# Patient Record
Sex: MALE | URBAN - NONMETROPOLITAN AREA
[De-identification: names, ages, dates, MRNs, and addresses within clinical notes are randomized per-mention and may not be internally consistent; named-entity substitution may affect disease eponyms.]

---

## 2022-11-01 ENCOUNTER — TELEPHONE (OUTPATIENT)
Dept: NEUROSURGERY | Age: 62
End: 2022-11-01

## 2022-11-01 NOTE — TELEPHONE ENCOUNTER
Patient was called and scheduled for 1/10/23 @ 1 on 10/13. Next available appointment for Bee Ibarra.

## 2022-11-02 ENCOUNTER — TELEPHONE (OUTPATIENT)
Dept: NEUROSURGERY | Age: 62
End: 2022-11-02

## 2022-11-02 NOTE — TELEPHONE ENCOUNTER
Patient called stating he had a referral sent to our office to see Dr. Kin Boyd. I let patient know I would have to transfer him to the  because they schedule for him.  was busy so I just gave them his MRN. I let patient know they were busy and would call him back. Patient stated, \"well I only have 2 days of medicine left. \" I let patient know he is booking out until Feb. 2023 and someone would call him back to schedule. Patient voiced understanding.

## 2022-12-22 ENCOUNTER — TELEPHONE (OUTPATIENT)
Dept: NEUROSURGERY | Age: 62
End: 2022-12-22

## 2022-12-22 NOTE — TELEPHONE ENCOUNTER
Tried calling patient to r/s appt 1/10 due to provider being out of the office. Only phone number listed has been disconnected. Pt appt changed to 1/3 @ 1:00. No other phone number listed on referral either.

## 2022-12-28 VITALS — HEIGHT: 62 IN | BODY MASS INDEX: 20.98 KG/M2 | WEIGHT: 114 LBS

## 2022-12-28 PROBLEM — F32.A DEPRESSIVE DISORDER: Status: ACTIVE | Noted: 2022-10-07

## 2022-12-28 PROBLEM — R56.9 SEIZURE (HCC): Status: ACTIVE | Noted: 2022-10-07

## 2022-12-28 PROBLEM — F41.9 ANXIETY: Status: ACTIVE | Noted: 2022-10-07

## 2022-12-28 RX ORDER — PHENYTOIN 50 MG/1
1 TABLET, CHEWABLE ORAL DAILY
COMMUNITY
Start: 2022-12-06

## 2022-12-28 RX ORDER — PHENYTOIN SODIUM 100 MG/1
1 CAPSULE, EXTENDED RELEASE ORAL 3 TIMES DAILY
COMMUNITY
Start: 2022-12-06

## 2022-12-28 RX ORDER — SPIRONOLACTONE 100 MG/1
1 TABLET, FILM COATED ORAL DAILY
COMMUNITY

## 2022-12-28 RX ORDER — TIOTROPIUM BROMIDE 18 UG/1
1 CAPSULE ORAL; RESPIRATORY (INHALATION) DAILY
COMMUNITY

## 2022-12-28 RX ORDER — PHENOBARBITAL 30 MG/1
1 TABLET ORAL 3 TIMES DAILY
COMMUNITY
Start: 2022-10-05

## 2023-03-09 ENCOUNTER — TELEPHONE (OUTPATIENT)
Dept: NEUROLOGY | Age: 63
End: 2023-03-09

## 2023-03-09 VITALS — WEIGHT: 119 LBS | HEIGHT: 62 IN | BODY MASS INDEX: 21.9 KG/M2

## 2023-03-09 NOTE — TELEPHONE ENCOUNTER
Called patient due to no showing the last 2 appt's with Dr Charleen Brooke to confirm his appt on Monday. The patient does not have a working number available. Canceling the appt that is scheduled for Monday, patient can call and r/s at any time.

## 2023-03-13 ENCOUNTER — OFFICE VISIT (OUTPATIENT)
Dept: NEUROLOGY | Age: 63
End: 2023-03-13
Payer: MEDICAID

## 2023-03-13 VITALS
BODY MASS INDEX: 21.9 KG/M2 | DIASTOLIC BLOOD PRESSURE: 70 MMHG | OXYGEN SATURATION: 95 % | SYSTOLIC BLOOD PRESSURE: 124 MMHG | HEIGHT: 62 IN | WEIGHT: 119 LBS | HEART RATE: 103 BPM

## 2023-03-13 DIAGNOSIS — R56.9 SEIZURE-LIKE ACTIVITY (HCC): Primary | ICD-10-CM

## 2023-03-13 DIAGNOSIS — Z79.899 MEDICATION MANAGEMENT: ICD-10-CM

## 2023-03-13 DIAGNOSIS — R56.9 SEIZURE-LIKE ACTIVITY (HCC): ICD-10-CM

## 2023-03-13 LAB
ALBUMIN SERPL-MCNC: 4 G/DL (ref 3.5–5.2)
ALP BLD-CCNC: 114 U/L (ref 40–130)
ALT SERPL-CCNC: 11 U/L (ref 5–41)
ANION GAP SERPL CALCULATED.3IONS-SCNC: 11 MMOL/L (ref 7–19)
AST SERPL-CCNC: 14 U/L (ref 5–40)
BASOPHILS ABSOLUTE: 0.1 K/UL (ref 0–0.2)
BASOPHILS RELATIVE PERCENT: 1 % (ref 0–1)
BILIRUB SERPL-MCNC: <0.2 MG/DL (ref 0.2–1.2)
BUN BLDV-MCNC: 14 MG/DL (ref 8–23)
CALCIUM SERPL-MCNC: 9.3 MG/DL (ref 8.8–10.2)
CHLORIDE BLD-SCNC: 102 MMOL/L (ref 98–111)
CO2: 26 MMOL/L (ref 22–29)
CREAT SERPL-MCNC: 0.9 MG/DL (ref 0.5–1.2)
EOSINOPHILS ABSOLUTE: 0.5 K/UL (ref 0–0.6)
EOSINOPHILS RELATIVE PERCENT: 7 % (ref 0–5)
GFR SERPL CREATININE-BSD FRML MDRD: >60 ML/MIN/{1.73_M2}
GLUCOSE BLD-MCNC: 82 MG/DL (ref 74–109)
HCT VFR BLD CALC: 43.5 % (ref 42–52)
HEMOGLOBIN: 14.5 G/DL (ref 14–18)
IMMATURE GRANULOCYTES #: 0 K/UL
LYMPHOCYTES ABSOLUTE: 2.2 K/UL (ref 1.1–4.5)
LYMPHOCYTES RELATIVE PERCENT: 30.9 % (ref 20–40)
MCH RBC QN AUTO: 31.7 PG (ref 27–31)
MCHC RBC AUTO-ENTMCNC: 33.3 G/DL (ref 33–37)
MCV RBC AUTO: 95 FL (ref 80–94)
MONOCYTES ABSOLUTE: 0.6 K/UL (ref 0–0.9)
MONOCYTES RELATIVE PERCENT: 9.1 % (ref 0–10)
NEUTROPHILS ABSOLUTE: 3.6 K/UL (ref 1.5–7.5)
NEUTROPHILS RELATIVE PERCENT: 51.7 % (ref 50–65)
PDW BLD-RTO: 13.5 % (ref 11.5–14.5)
PHENOBARBITAL LEVEL: 14.3 UG/ML (ref 15–40)
PHENYTOIN LEVEL: 8.1 UG/ML (ref 10–20)
PLATELET # BLD: 258 K/UL (ref 130–400)
PMV BLD AUTO: 10.3 FL (ref 9.4–12.4)
POTASSIUM SERPL-SCNC: 4.4 MMOL/L (ref 3.5–5)
RBC # BLD: 4.58 M/UL (ref 4.7–6.1)
SODIUM BLD-SCNC: 139 MMOL/L (ref 136–145)
TOTAL PROTEIN: 7.6 G/DL (ref 6.6–8.7)
WBC # BLD: 7 K/UL (ref 4.8–10.8)

## 2023-03-13 PROCEDURE — 99204 OFFICE O/P NEW MOD 45 MIN: CPT | Performed by: NURSE PRACTITIONER

## 2023-03-13 RX ORDER — PHENYTOIN 50 MG/1
50 TABLET, CHEWABLE ORAL DAILY
Qty: 90 TABLET | Refills: 1 | Status: SHIPPED | OUTPATIENT
Start: 2023-03-13

## 2023-03-13 RX ORDER — PHENYTOIN SODIUM 100 MG/1
100 CAPSULE, EXTENDED RELEASE ORAL 3 TIMES DAILY
Qty: 180 CAPSULE | Refills: 1 | Status: SHIPPED | OUTPATIENT
Start: 2023-03-13

## 2023-03-13 NOTE — PROGRESS NOTES
[facility] Neurology Office Note      Patient:   Angela Perez  MR#:    [id number]  Account Number:                         YOB: 1960  Date of Evaluation:  3/13/2023  Time of Note:                          3:31 PM  Primary/Referring Physician:  Roger Thurston   Consulting Physician:  BRIGIDA Syed    NEW PATIENT CONSULTATION      Chief Complaint   Patient presents with    New Patient    Seizures       HISTORY OF PRESENT ILLNESS    Angela Perez is a 57y.o. year old male here for seizure disorder. He relates seizure started at around age 1. History of prematurity. He states it has been 3 years since his last seizure. Seizures typically characterized by loss of consciousness with generalized tonic-clonic activity. There is both tongue biting and urinary incontinence. There is postictal state afterwards. He states seizures are sometimes preceded by weakness and dizziness, but not always. Denies clear consistent aura. There is no known history of TBI, meningitis, encephalitis. He recently relocated here from [place], [place]. Has been in the area about 6 months. He is on Dilantin 50 mg chewable tablet, Dilantin capsules 100 mg 3 times daily. Is also on phenobarbital 30 mg 3 times daily. No issues with side effects at present. Has had EEG in the past, he is unsure of when. Has been followed by neurology in the past, he is unsure of where or when. No other concerns today. Past Medical History:   Diagnosis Date    Anxiety     Depression     Seizure [facility])        Past Surgical History:   Procedure Laterality Date    HERNIA REPAIR      SHOULDER SURGERY         History reviewed. No pertinent family history.     Social History     Socioeconomic History    Marital status: Unknown     Spouse name: Not on file    Number of children: Not on file    Years of education: Not on file    Highest education level: Not on file   Occupational History    Not on file   Tobacco Use    Smoking status: Not on file    Smokeless tobacco: Not on file   Substance and Sexual Activity    Alcohol use: Not on file    Drug use: Not on file    Sexual activity: Not on file   Other Topics Concern    Not on file   Social History Narrative    Not on file     Social Determinants of Health     Financial Resource Strain: Not on file   Food Insecurity: Not on file   Transportation Needs: Not on file   Physical Activity: Not on file   Stress: Not on file   Social Connections: Not on file   Intimate Partner Violence: Not on file   Housing Stability: Not on file       Current Outpatient Medications   Medication Sig Dispense Refill    phenytoin (DILANTIN) 50 MG tablet Take 1 tablet by mouth daily 90 tablet 1    phenytoin (DILANTIN) 100 MG ER capsule Take 1 capsule by mouth 3 times daily 180 capsule 1    estradiol valerate (DELESTROGEN) 20 MG/ML injection Inject 0.2 mLs into the muscle once a week      PHENobarbital (LUMINAL) 30 MG tablet Take 1 tablet by mouth 3 times daily. spironolactone (ALDACTONE) 100 MG tablet Take 1 tablet by mouth daily      tiotropium (SPIRIVA HANDIHALER) 18 MCG inhalation capsule Inhale 1 capsule into the lungs daily       No current facility-administered medications for this visit.        Allergies   Allergen Reactions    Fish-Derived Products     Penicillin G Nausea Only     Other reaction(s): vomiting    Dm-Phenylephrine-Acetaminophen Rash         REVIEW OF SYSTEMS  Constitutional: []Fever []Sweat []Chills [] Recent Injury [x] Denies all unless marked  HEENT:[]Headache  [] Head Injury/Hearing Loss  [] Sore Throat  [] Ear Ache/Dizziness  [x] Denies all unless marked  Spine:  [] Neck pain  [] Back pain  [] Sciaticia  [x] Denies all unless marked  Cardiovascular:[]Heart Disease []Chest Pain [] Palpitations  [x] Denies all unless marked  Pulmonary: []Shortness of Breath []Cough   [x] Denies all unless marke  Gastrointestinal: []Nausea  []Vomiting  []Abdominal Pain  []Constipation  []Diarrhea  []Dark Bloody Stools  [x] Denies all unless marked  Psychiatric/Behavioral:[] Depression [] Anxiety [x] Denies all unless marked  Genitourinary:   [] Frequency  [] Urgency  [] Incontinence [] Pain with Urination  [x] Denies all unless marked  Extremities: []Pain  []Swelling  [x] Denies all unless marked  Musculoskeletal: [] Muscle Pain  [] Joint Pain  [] Arthritis [] Muscle Cramps [] Muscle Twitches  [x] Denies all unless marked  Sleep: [] Insomnia [] Snoring [] Restless Legs [] Sleep Apnea  [] Daytime Sleepiness  [x] Denies all unless marked  Skin:[] Rash [] Skin Discoloration [x] Denies all unless marked   Neurological: []Visual Disturbance/Memory Loss [] Loss of Balance [] Slurred Speech/Weakness [] Seizures  [] Vertigo/Dizziness [x] Denies all unless marked    The MA has completed the ROS with the patient. I have reviewed it in its' entirety with the patient and agree with the documentation. PHYSICAL EXAM  /70   Pulse (!) 103   Ht 5' 2\" (1.575 m)   Wt 119 lb (54 kg)   SpO2 95%   BMI 21.77 kg/m²       Constitutional - No acute distress    HEENT- Conjunctiva normal.  No scars, masses, or lesions over external nose or ears, no neck masses noted, no jugular vein distension, no bruit  Cardiac- Regular rate and rhythm  Pulmonary- Good expansion, normal effort without use of accessory muscles  Musculoskeletal - No significant wasting of muscles noted, no bony deformities  Extremities - No clubbing, cyanosis or edema  Skin - Warm, dry, and intact. No rash, erythema, or pallor  Psychiatric - Mood, affect, and behavior appear normal      NEUROLOGICAL EXAM     Mental status   [x] Awake, alert, oriented   [x]Affect attention and concentration appear appropriate  [x]Recent and remote memory appears unremarkable  [x]Speech normal without dysarthria or aphasia, comprehension and repetition intact.    COMMENTS:    Cranial Nerves [x]No VF deficit to confrontation  [x]PERRLA, EOMI, no nystagmus, conjugate eye movements, no ptosis  [x]Face symmetric  [x]Facial sensation intact  [x]Tongue midline no atrophy or fasciculations present  [x]Palate midline, hearing to finger rub normal bilaterally  [x]Shoulder shrug and SCM testing normal bilaterally  COMMENTS:   Motor   [x]5/5 strength x 4 extremities  [x]Normal bulk and tone  [x]No tremor present  [x]No rigidity or bradykinesia noted  COMMENTS:   Sensory  [x]Sensation intact to light touch, vibration, and proprioception BLE  [x]Sensation intact to light touch, vibration, and proprioception BUE  COMMENTS:   Coordination [x]FTN normal bilaterally   []HTS normal bilaterally  [x]PRASANTH normal bilaterally. COMMENTS:   Reflexes  [x]Symmetric and non-pathological  []Toes down going bilaterally  [x]No clonus present  COMMENTS:   Gait                  [x]Normal steady gait    []Ataxic    []Spastic     []Magnetic     []Shuffling  COMMENTS:       LABS RECORD AND IMAGING REVIEW (As below and per HPI)      Reviewed referral records     ASSESSMENT:    Foreign Corona is a 57y.o. year old male here for establishment of care of seizure disorder. He has had seizure disorder since age 1. Seizures characterized by loss of consciousness with GTC activity. No events for about 3 years. He is on Dilantin 50 mg chewable tablet, Dilantin capsules 100 mg 3 times daily. Is also on phenobarbital 30 mg 3 times daily. No issues with side effects at present. Has been sometime since last EEG. Has recently relocated here from [facility]. No records from prior neurology care. We will order EEG, MRI brain, blood work today. Will refill Dilantin and phenobarbital and have him follow-up with Dr. Amado Yanez. ICD-10-CM    1. Seizure-like activity ([facility] [address]  R56.9 Phenytoin level, total     Phenobarbital Level     MRI BRAIN W WO CONTRAST     EEG awake and asleep     CBC with Auto Differential     Comprehensive Metabolic Panel     phenytoin (DILANTIN) 50 MG tablet     phenytoin (DILANTIN) 100 MG ER capsule      2. Medication management  Z79.899           PLAN:  Labs as listed today. EEG awake and asleep . MRI Brain W WO- Open due to claustrophobia. Epilepsy precautions and seizure first aid discussed. No heights, swimming, tub baths, open flames, or heavy machinery. Driving per [employer] Continue phenobarbital 30 mg 3 times daily. Levels drawn today. Continue Dilantin capsules 100 mg 3 times daily, Dilantin chewable 50 mg daily. Levels drawn today. 7. Return in about 3 months (around 6/13/2023) for Schedule with Dr. Neil Riddle, Follow up, sooner with worsening symptoms. BRIGIDA Deutsch    This dictation was generated by voice recognition computer software. Although all attempts were made to edit the dictation for accuracy, there may be errors in the transcription that are not intended.

## 2023-03-14 DIAGNOSIS — R56.9 SEIZURE-LIKE ACTIVITY (HCC): Primary | ICD-10-CM

## 2023-03-14 RX ORDER — PHENOBARBITAL 30 MG/1
30 TABLET ORAL 3 TIMES DAILY
Qty: 90 TABLET | Refills: 3 | Status: SHIPPED | OUTPATIENT
Start: 2023-03-14 | End: 2023-04-13

## 2023-03-14 NOTE — TELEPHONE ENCOUNTER
Requested Prescriptions     Pending Prescriptions Disp Refills    PHENobarbital (LUMINAL) 30 MG tablet 90 tablet 3     Sig: Take 1 tablet by mouth 3 times daily for 30 days.  Max Daily Amount: 90 mg       Last Office Visit:  3/13/2023  Next Office Visit:  6/13/2023  Last Medication Refill: 12/28/22  Leonidas Cooney up to date:  03/14/2023    *RX updated to reflect   03/14/2023  fill date*

## 2023-05-23 ENCOUNTER — TELEPHONE (OUTPATIENT)
Dept: NEUROSURGERY | Age: 63
End: 2023-05-23

## 2023-05-24 NOTE — TELEPHONE ENCOUNTER
Called patient back to verify which appointment he was needing to r/s. Pt stated he just needed to know the day and times of his upcoming appointments. I told patient the days and times of his testing and appointment with Dr. Erline Castleman. Pt voiced understanding.

## 2023-06-29 DIAGNOSIS — R56.9 SEIZURE-LIKE ACTIVITY (HCC): ICD-10-CM

## 2023-06-29 RX ORDER — PHENYTOIN SODIUM 100 MG/1
100 CAPSULE, EXTENDED RELEASE ORAL 3 TIMES DAILY
Qty: 180 CAPSULE | Refills: 1 | Status: SHIPPED | OUTPATIENT
Start: 2023-06-29

## 2023-06-29 RX ORDER — PHENOBARBITAL 30 MG/1
30 TABLET ORAL 3 TIMES DAILY
Status: CANCELLED | OUTPATIENT
Start: 2023-06-29

## 2023-06-29 RX ORDER — PHENOBARBITAL 30 MG/1
30 TABLET ORAL 3 TIMES DAILY
Qty: 90 TABLET | Refills: 0 | Status: SHIPPED | OUTPATIENT
Start: 2023-07-16 | End: 2023-08-15

## 2023-07-14 DIAGNOSIS — R56.9 SEIZURE-LIKE ACTIVITY (HCC): ICD-10-CM

## 2023-07-14 RX ORDER — PHENYTOIN SODIUM 100 MG/1
100 CAPSULE, EXTENDED RELEASE ORAL 3 TIMES DAILY
Qty: 180 CAPSULE | Refills: 1 | OUTPATIENT
Start: 2023-07-14

## 2023-07-14 RX ORDER — PHENOBARBITAL 30 MG/1
TABLET ORAL
Qty: 90 TABLET | Refills: 3 | OUTPATIENT
Start: 2023-07-16 | End: 2023-08-15

## 2023-07-14 NOTE — TELEPHONE ENCOUNTER
Patient has a refill on file at pharmacy, tried to contact patient. Busy tone, not able to leave message.

## 2023-08-11 DIAGNOSIS — R56.9 SEIZURE-LIKE ACTIVITY (HCC): ICD-10-CM

## 2023-08-11 RX ORDER — PHENOBARBITAL 30 MG/1
30 TABLET ORAL 3 TIMES DAILY
Qty: 90 TABLET | Refills: 0 | Status: SHIPPED | OUTPATIENT
Start: 2023-08-11 | End: 2023-09-10

## 2023-08-11 NOTE — TELEPHONE ENCOUNTER
Susana Armendariz called to request a refill on his medication. Last office visit : 6/13/2023   Next office visit : 12/13/2023     Requested Prescriptions     Pending Prescriptions Disp Refills    PHENobarbital (LUMINAL) 30 MG tablet 90 tablet 0     Sig: Take 1 tablet by mouth 3 times daily for 30 days.  Max Daily Amount: 90 mg            [place], [place]

## 2023-09-12 DIAGNOSIS — R56.9 SEIZURE-LIKE ACTIVITY (HCC): ICD-10-CM

## 2023-09-12 RX ORDER — PHENYTOIN 50 MG/1
50 TABLET, CHEWABLE ORAL DAILY
Qty: 90 TABLET | Refills: 1 | Status: SHIPPED | OUTPATIENT
Start: 2023-09-12

## 2023-09-12 RX ORDER — PHENOBARBITAL 30 MG/1
TABLET ORAL
Qty: 90 TABLET | Refills: 5 | Status: SHIPPED | OUTPATIENT
Start: 2023-09-13 | End: 2023-10-13

## 2023-09-12 NOTE — TELEPHONE ENCOUNTER
Requested Prescriptions     Pending Prescriptions Disp Refills    PHENobarbital (LUMINAL) 30 MG tablet [Pharmacy Med Name: PHENOBARBITAL 30MG TABLET] 90 tablet 0     Sig: TAKE 1 TABLET BY MOUTH 3 TIMES DAILY FOR 30 DAYS.  MAX DAILY AMOUNT: 90 MILLIGRAM       Last Office Visit:  6/13/2023  Next Office Visit:  12/13/2023  Last Medication Refill:  8/11/2023 with 0 ILYA Alfonso Co up to date:  9/12/2023    *RX updated to reflect   9/13/2023  fill date*

## 2023-09-12 NOTE — TELEPHONE ENCOUNTER
Requested Prescriptions     Pending Prescriptions Disp Refills    phenytoin (DILANTIN) 50 MG tablet [Pharmacy Med Name: PHENYTOIN 50MG TABLET CHEWABLE] 90 tablet 1     Sig: TAKE 1 TABLET BY MOUTH DAILY       Last Office Visit: 6/13/2023  Next Office Visit: 9/12/2023  Last Medication Refill: 3/13/2023 with 1 ILYA

## 2023-10-11 DIAGNOSIS — R56.9 SEIZURE-LIKE ACTIVITY (HCC): ICD-10-CM

## 2023-10-11 RX ORDER — PHENYTOIN SODIUM 100 MG/1
100 CAPSULE, EXTENDED RELEASE ORAL 3 TIMES DAILY
Qty: 180 CAPSULE | Refills: 1 | Status: SHIPPED | OUTPATIENT
Start: 2023-10-11

## 2023-10-11 NOTE — TELEPHONE ENCOUNTER
Requested Prescriptions     Pending Prescriptions Disp Refills    phenytoin (DILANTIN) 100 MG ER capsule [Pharmacy Med Name: PHENYTOIN SODIUM EXTENDED 100MG CAPSULE] 180 capsule 1     Sig: TAKE 1 CAPSULE BY MOUTH 3 TIMES DAILY       Last Office Visit: 6/13/2023  Next Office Visit: 12/13/2023  Last Medication Refill: 6/29/2023 with 1 RF

## 2024-02-07 DIAGNOSIS — R56.9 SEIZURE-LIKE ACTIVITY (HCC): ICD-10-CM

## 2024-02-07 RX ORDER — PHENYTOIN 50 MG/1
50 TABLET, CHEWABLE ORAL DAILY
Qty: 90 TABLET | Refills: 1 | Status: SHIPPED | OUTPATIENT
Start: 2024-02-07

## 2024-02-07 NOTE — TELEPHONE ENCOUNTER
Requested Prescriptions     Pending Prescriptions Disp Refills    phenytoin (DILANTIN) 50 MG tablet [Pharmacy Med Name: PHENYTOIN 50MG TABLET CHEWABLE] 90 tablet 1     Sig: TAKE 1 TABLET BY MOUTH DAILY       Last Office Visit: 6/13/2023  Next Office Visit: 3/6/2024  Last Medication Refill: 9/12/2023 with 1 ILYA

## 2024-03-06 ENCOUNTER — OFFICE VISIT (OUTPATIENT)
Dept: NEUROLOGY | Age: 64
End: 2024-03-06
Payer: MEDICARE

## 2024-03-06 VITALS
HEIGHT: 62 IN | DIASTOLIC BLOOD PRESSURE: 78 MMHG | BODY MASS INDEX: 21.16 KG/M2 | OXYGEN SATURATION: 96 % | SYSTOLIC BLOOD PRESSURE: 128 MMHG | WEIGHT: 115 LBS | HEART RATE: 84 BPM

## 2024-03-06 DIAGNOSIS — M81.0 OSTEOPOROSIS, UNSPECIFIED OSTEOPOROSIS TYPE, UNSPECIFIED PATHOLOGICAL FRACTURE PRESENCE: Primary | ICD-10-CM

## 2024-03-06 DIAGNOSIS — R56.9 SEIZURE-LIKE ACTIVITY (HCC): ICD-10-CM

## 2024-03-06 PROCEDURE — 1123F ACP DISCUSS/DSCN MKR DOCD: CPT | Performed by: PSYCHIATRY & NEUROLOGY

## 2024-03-06 PROCEDURE — G8427 DOCREV CUR MEDS BY ELIG CLIN: HCPCS | Performed by: PSYCHIATRY & NEUROLOGY

## 2024-03-06 PROCEDURE — 4004F PT TOBACCO SCREEN RCVD TLK: CPT | Performed by: PSYCHIATRY & NEUROLOGY

## 2024-03-06 PROCEDURE — 99214 OFFICE O/P EST MOD 30 MIN: CPT | Performed by: PSYCHIATRY & NEUROLOGY

## 2024-03-06 PROCEDURE — G8420 CALC BMI NORM PARAMETERS: HCPCS | Performed by: PSYCHIATRY & NEUROLOGY

## 2024-03-06 PROCEDURE — 3017F COLORECTAL CA SCREEN DOC REV: CPT | Performed by: PSYCHIATRY & NEUROLOGY

## 2024-03-06 PROCEDURE — G8484 FLU IMMUNIZE NO ADMIN: HCPCS | Performed by: PSYCHIATRY & NEUROLOGY

## 2024-03-06 RX ORDER — PHENOBARBITAL 30 MG/1
30 TABLET ORAL 3 TIMES DAILY
COMMUNITY
Start: 2024-02-07 | End: 2024-03-06 | Stop reason: SDUPTHER

## 2024-03-06 RX ORDER — PHENYTOIN 50 MG/1
50 TABLET, CHEWABLE ORAL DAILY
Qty: 90 TABLET | Refills: 3 | Status: SHIPPED | OUTPATIENT
Start: 2024-03-06

## 2024-03-06 RX ORDER — PHENOBARBITAL 30 MG/1
30 TABLET ORAL 3 TIMES DAILY
Qty: 90 TABLET | Refills: 5 | Status: SHIPPED | OUTPATIENT
Start: 2024-03-06 | End: 2024-09-02

## 2024-03-06 RX ORDER — PHENYTOIN SODIUM 100 MG/1
100 CAPSULE, EXTENDED RELEASE ORAL 3 TIMES DAILY
Qty: 270 CAPSULE | Refills: 3 | Status: SHIPPED | OUTPATIENT
Start: 2024-03-06

## 2024-03-06 NOTE — PROGRESS NOTES
DEXA scan, supplement calcium and vit d   Epilepsy precautions and seizure first aid discussed.  Driving per [place] state law, no heights, swimming, tub baths, open flames, or heavy machinery.        Anthony Stubbs DO  Board Certified Neurology

## 2024-04-08 ENCOUNTER — TELEPHONE (OUTPATIENT)
Dept: NEUROLOGY | Age: 64
End: 2024-04-08

## 2024-04-08 NOTE — TELEPHONE ENCOUNTER
Patient called into the office demanding Dr Stubbs send in his Phenobarbital   to an [place] pharmacy. I explained that due to it being a controlled medication that it has to be sent to a [place] pharmacy. Patient continued  to yell at me and tell me that he knew this and that he would not be home for several more weeks, I stated that I would have to send a message to Dr Stubbs to get this approved due to it being his seizure medication.   Patient voiced understanding.

## 2024-04-09 NOTE — TELEPHONE ENCOUNTER
Tried to call patient his vm is full   Per Dr Stubbs   If it has to be in [place] due to it being scheduled then those are the rules and will have to have that filled in [place].  Chris up a script the the pharmacy that is legal and not breaking any rules.  Will have to send the script there.     Evelyne     Due to his phenobarbital being a controlled medication we cannot send across state lines. Will have to fill in the stated of [place] per Dr Stubbs.

## 2024-04-09 NOTE — TELEPHONE ENCOUNTER
It is a controlled medication, will call patient back to let him know as well.   Thank you   Tayler DASH